# Patient Record
Sex: MALE | Race: WHITE
[De-identification: names, ages, dates, MRNs, and addresses within clinical notes are randomized per-mention and may not be internally consistent; named-entity substitution may affect disease eponyms.]

---

## 2017-02-24 ENCOUNTER — HOSPITAL ENCOUNTER (EMERGENCY)
Dept: HOSPITAL 25 - UCEAST | Age: 22
Discharge: HOME | End: 2017-02-24
Payer: COMMERCIAL

## 2017-02-24 VITALS — DIASTOLIC BLOOD PRESSURE: 65 MMHG | SYSTOLIC BLOOD PRESSURE: 126 MMHG

## 2017-02-24 DIAGNOSIS — J11.1: Primary | ICD-10-CM

## 2017-02-24 DIAGNOSIS — F17.220: ICD-10-CM

## 2017-02-24 PROCEDURE — 99211 OFF/OP EST MAY X REQ PHY/QHP: CPT

## 2017-02-24 PROCEDURE — G0463 HOSPITAL OUTPT CLINIC VISIT: HCPCS

## 2017-02-24 PROCEDURE — 87651 STREP A DNA AMP PROBE: CPT

## 2017-02-24 NOTE — UC
FLU HPI





- HPI Summary


HPI Summary: 





Fever, nausea, chills, aches, "dry" throat, and nasal congestion since this 

morning. Pt had tonsillectomy years ago, used to get strep a lot. Works at and 

goes to a college.





- History of Current Complaint


Chief Complaint: UCGeneralIllness


Stated Complaint: BODY ACHES  HEADACHE


Time Seen by Provider: 02/24/17 12:47


Hx Obtained From: Patient


Onset/Duration: Gradual Onset, Lasting Hours


Severity Currently: Moderate


Severity Initially: Moderate


Associated Signs & Symptoms: Positive: Fever, Sore Throat, Nasal Congestion.  

Negative: Cough





- Allergy/Home Medications


Allergies/Adverse Reactions: 


 Allergies











Allergy/AdvReac Type Severity Reaction Status Date / Time


 


No Known Allergies Allergy   Verified 02/24/17 12:27











Home Medications: 


 Home Medications





Loratadine & Pseudoephedrine [Claritin-D 12 Hour] 1 tab PO DAILY 02/24/17 [

History Confirmed 02/24/17]











PMH/Surg Hx/FS Hx/Imm Hx


Endocrine History Of: 


   Denies: Diabetes, Hyperthyroidism, Hypothyroidism


Cardiovascular History Of: 


   Denies: Hypertension, Pacemaker/ICD


Respiratory History Of: Reports: Asthma, Bronchitis


Psychological History Of: 


   Denies: Anxiety, Depression





- Surgical History


Surgical History: Yes


Surgery Procedure, Year, and Place: tubes in ears/T&A





- Family History


Known Family History: Positive: Hypertension, Diabetes, Other - HLD


   Negative: Cardiac Disease





- Social History


Occupation: Employed Part-time, Student


Alcohol Use: None


Substance Use Type: None


Smoking Status (MU): Heavy Every Day Tobacco Smoker


Type: Smokeless Tobacco


Amount Used/How Often: 1 can per day


Length of Time of Smoking/Using Tobacco: started at age 13


Have You Smoked in the Last Year: Yes


When Did the Patient Quit Smoking/Using Tobacco: quit smoking cigg at age 18





- Immunization History


Vaccination Up to Date: Yes





Review of Systems


Constitutional: Fever, Chills, Fatigue


Skin: Negative


Eyes: Negative


ENT: Sore Throat, Nasal Discharge


Respiratory: Negative


Cardiovascular: Negative


Gastrointestinal: Other - nausea


Genitourinary: Negative


Motor: Negative


Neurovascular: Negative


Musculoskeletal: Negative


Neurological: Negative


Psychological: Negative


All Other Systems Reviewed And Are Negative: Yes





Physical Exam


Triage Information Reviewed: Yes


Appearance: No Pain Distress, Well-Nourished


Vital Signs: 


 Initial Vital Signs











Temp  101.4 F   02/24/17 12:21


 


Pulse  109   02/24/17 12:21


 


Resp  16   02/24/17 12:21


 


BP  126/65   02/24/17 12:21


 


Pulse Ox  97   02/24/17 12:21











Vital Signs Reviewed: Yes


Eye Exam: Normal


Eyes: Positive: Conjunctiva Clear


ENT: Positive: Pharyngeal erythema, Nasal congestion, TMs normal.  Negative: 

Tonsillar exudate - no tonsils, but exudate present on oropharynx


Dental Exam: Normal


Neck: Positive: Supple, Enlarged Nodes @ - tonsillar


Respiratory Exam: Normal


Respiratory: Positive: Chest non-tender, Lungs clear, Normal breath sounds, No 

respiratory distress, No accessory muscle use


Cardiovascular: Positive: No Murmur, Tachycardia


Musculoskeletal Exam: Normal


Neurological Exam: Normal


Psychological Exam: Normal


Skin Exam: Normal





Flu Course/Dx





- Course


Course Of Treatment: Discussed rx of tamiflu, pt would prefer to recover on his 

own. Reviewed s/sx of worsening or secondary infection.





- Differential Dx/Diagnosis


Provider Diagnoses: Influenza





Discharge





- Discharge Plan


Condition: Stable


Disposition: HOME


Patient Education Materials:  Influenza (ED)


Forms:  *Work Release


Referrals: 


Jorden Hurtado NP [Primary Care Provider] - If Needed


Additional Instructions: 


Call or return if you develop increasing fever, shortness of breath, chest pain

, bloody sputum, or otherwise worsen.  If you have not improved at all after 

several days, contact your primary care physician or return here.

## 2019-03-31 ENCOUNTER — HOSPITAL ENCOUNTER (EMERGENCY)
Dept: HOSPITAL 25 - UCEAST | Age: 24
Discharge: HOME | End: 2019-03-31
Payer: COMMERCIAL

## 2019-03-31 VITALS — DIASTOLIC BLOOD PRESSURE: 72 MMHG | SYSTOLIC BLOOD PRESSURE: 128 MMHG

## 2019-03-31 DIAGNOSIS — Z91.013: ICD-10-CM

## 2019-03-31 DIAGNOSIS — H66.92: ICD-10-CM

## 2019-03-31 DIAGNOSIS — T70.0XXA: Primary | ICD-10-CM

## 2019-03-31 DIAGNOSIS — H72.92: ICD-10-CM

## 2019-03-31 DIAGNOSIS — F17.290: ICD-10-CM
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
## 2019-03-31 PROCEDURE — G0463 HOSPITAL OUTPT CLINIC VISIT: HCPCS

## 2019-03-31 PROCEDURE — 99212 OFFICE O/P EST SF 10 MIN: CPT
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
## 2019-09-04 ENCOUNTER — HOSPITAL ENCOUNTER (EMERGENCY)
Dept: HOSPITAL 25 - UCEAST | Age: 24
Discharge: HOME | End: 2019-09-04
Payer: COMMERCIAL

## 2019-09-04 VITALS — SYSTOLIC BLOOD PRESSURE: 118 MMHG | DIASTOLIC BLOOD PRESSURE: 78 MMHG

## 2019-09-04 DIAGNOSIS — M54.5: Primary | ICD-10-CM

## 2019-09-04 DIAGNOSIS — Z87.891: ICD-10-CM

## 2019-09-04 PROCEDURE — 99211 OFF/OP EST MAY X REQ PHY/QHP: CPT

## 2019-09-04 PROCEDURE — G0463 HOSPITAL OUTPT CLINIC VISIT: HCPCS

## 2019-09-04 NOTE — UC
Back Pain HPI





- HPI Summary


HPI Summary: 


23-year-old male comes in with chief complaint of low back pain.  While patient 

was lifting yesterday at the [place] on September 3, 2019 had sudden 

onset of low back pain.  He also became lightheaded and was transported to [facility] emergency department where he had lab work and imaging.  He was sent 

home with lidocaine patches ibuprofen and hydrocodone.  Pains in the mid lower 

back.  Does radiate up some and down some towards the tailbone.  No radiation 

down into the legs no weakness numb numbness.  No plan of any loss of control 

of urine or bowels.  Pain is worse with movement.





- History of Current Complaint


Chief Complaint: UCBackPain


Stated Complaint: BACK INJURY


Time Seen by Provider: 09/04/19 18:25


Pain Intensity: 4





- Allergies/Home Medications


Allergies/Adverse Reactions: 


 Allergies











Allergy/AdvReac Type Severity Reaction Status Date / Time


 


shell fish Allergy  Shortness Uncoded 09/04/19 18:25





   of Breath  











Home Medications: 


 Home Medications





HYDROcodone/ACETAMIN 5-325 MG* [Norco 5-325 TAB*] 1 mg PO Q6H PRN 09/04/19 [

History Confirmed 09/04/19]


Lidocaine PATCH 5%* [Lidoderm 5% Patch*] 1 patch TOPICAL DAILY 09/04/19 [

History Confirmed 09/04/19]











PMH/Surg Hx/FS Hx/Imm Hx


Previously Healthy: Yes





- Surgical History


Surgical History: Yes


Surgery Procedure, Year, and Place: tubes in ears/T&A





- Family History


Known Family History: Positive: Hypertension, Diabetes, Other - HLD


   Negative: Cardiac Disease





- Social History


Alcohol Use: None


Substance Use Type: None


Smoking Status (MU): Former Smoker


Type: Smokeless Tobacco


Amount Used/How Often: 1 can per day


Length of Time of Smoking/Using Tobacco: started at age 13


Have You Smoked in the Last Year: Yes


When Did the Patient Quit Smoking/Using Tobacco: quit smoking cigg at age 18





- Immunization History


Vaccination Up to Date: Yes





Review of Systems


All Other Systems Reviewed And Are Negative: Yes


Constitutional: Positive: Negative


Skin: Positive: Negative


Eyes: Positive: Negative


ENT: Positive: Negative


Respiratory: Positive: Negative


Cardiovascular: Positive: Negative


Gastrointestinal: Positive: Negative


Genitourinary: Positive: Negative


Motor: Positive: Other - SEE HPI


Neurovascular: Positive: Negative


Musculoskeletal: Positive: Other: - SEE HPI


Neurological: Positive: Negative


Psychological: Positive: Negative


Is Patient Immunocompromised?: No





Physical Exam


Triage Information Reviewed: Yes


Appearance: Well-Appearing, Well-Nourished, Pain Distress - MILD WITH ROM


Vital Signs: 


 Initial Vital Signs











Temp  98.7 F   09/04/19 18:21


 


Pulse  66   09/04/19 18:21


 


Resp  18   09/04/19 18:21


 


BP  118/78   09/04/19 18:21


 


Pulse Ox  100   09/04/19 18:21











Vital Signs Reviewed: Yes


Eye Exam: Normal


Eyes: Positive: Conjunctiva Clear


Neck: Positive: Supple


Respiratory: Positive: No respiratory distress


Musculoskeletal: Positive: Other: - Tender to palpation mid low back.  Legs 

have full range of motion and full strength with normal sensation.  He does 

have some increase in low back pain with knee extension and hip flexion on the 

right.


Neurological: Positive: Alert


Psychological: Positive: Age Appropriate Behavior


Skin Exam: Normal





Back Pain Course/Dx





- Course


Course Of Treatment: 


Patient will continue with the ibuprofen lidocaine patch and hydrocodone as 

needed.  He will follow-up with either occupational medicine or sports 

medicine.  Reevaluate sooner if worse or any questions or concerns.





- Differential Dx/Diagnosis


Provider Diagnosis: 


 Low back pain








Discharge ED





- Sign-Out/Discharge


Documenting (check all that apply): Patient Departure


All imaging exams completed and their final reports reviewed: No Studies





- Discharge Plan


Condition: Stable


Disposition: HOME


Patient Education Materials:  Acute Low Back Pain (ED)


Referrals: 


Tayler Zarate MD [Primary Care Provider] - 


Ricardo Abdalla MD [Medical Doctor] - 


[facility] [Provider Group]


Additional Instructions: 


FOLLOW UP WITH DR ABDALLA, OCCUPATIONAL MEDICINE, OR SPORTS MEDICINE.


GET RECHECKED SOONER IF YOUR CONDITION WORSENS; PAIN, WEAKNESS, NUMBNESS, 

DIFFICULTY CONTROLLING BOWEL OR BLADDER OR ANY QUESTIONS OR CONCERNS.





- Billing Disposition and Condition


Condition: STABLE


Disposition: Home